# Patient Record
Sex: MALE | Race: WHITE | NOT HISPANIC OR LATINO
[De-identification: names, ages, dates, MRNs, and addresses within clinical notes are randomized per-mention and may not be internally consistent; named-entity substitution may affect disease eponyms.]

---

## 2021-01-01 ENCOUNTER — APPOINTMENT (OUTPATIENT)
Dept: INTERNAL MEDICINE | Facility: CLINIC | Age: 56
End: 2021-01-01
Payer: SELF-PAY

## 2021-01-01 ENCOUNTER — OUTPATIENT (OUTPATIENT)
Dept: OUTPATIENT SERVICES | Facility: HOSPITAL | Age: 56
LOS: 1 days | Discharge: HOME | End: 2021-01-01

## 2021-01-01 ENCOUNTER — NON-APPOINTMENT (OUTPATIENT)
Age: 56
End: 2021-01-01

## 2021-01-01 ENCOUNTER — EMERGENCY (EMERGENCY)
Facility: HOSPITAL | Age: 56
LOS: 0 days | End: 2021-11-12
Attending: EMERGENCY MEDICINE | Admitting: EMERGENCY MEDICINE
Payer: SELF-PAY

## 2021-01-01 ENCOUNTER — TRANSCRIPTION ENCOUNTER (OUTPATIENT)
Age: 56
End: 2021-01-01

## 2021-01-01 ENCOUNTER — APPOINTMENT (OUTPATIENT)
Dept: INTERNAL MEDICINE | Facility: CLINIC | Age: 56
End: 2021-01-01

## 2021-01-01 ENCOUNTER — INPATIENT (INPATIENT)
Facility: HOSPITAL | Age: 56
LOS: 0 days | Discharge: HOME | End: 2021-10-21
Attending: HOSPITALIST | Admitting: HOSPITALIST
Payer: SELF-PAY

## 2021-01-01 ENCOUNTER — EMERGENCY (EMERGENCY)
Facility: HOSPITAL | Age: 56
LOS: 0 days | Discharge: LEFT BEFORE DISCHARGE | End: 2021-10-18
Attending: EMERGENCY MEDICINE | Admitting: EMERGENCY MEDICINE
Payer: SELF-PAY

## 2021-01-01 VITALS
SYSTOLIC BLOOD PRESSURE: 184 MMHG | WEIGHT: 179.9 LBS | HEART RATE: 107 BPM | OXYGEN SATURATION: 95 % | RESPIRATION RATE: 20 BRPM | DIASTOLIC BLOOD PRESSURE: 112 MMHG | HEIGHT: 72 IN | TEMPERATURE: 96 F

## 2021-01-01 VITALS
RESPIRATION RATE: 22 BRPM | HEIGHT: 72 IN | OXYGEN SATURATION: 96 % | DIASTOLIC BLOOD PRESSURE: 122 MMHG | SYSTOLIC BLOOD PRESSURE: 237 MMHG | HEART RATE: 112 BPM | TEMPERATURE: 96 F | WEIGHT: 179.9 LBS

## 2021-01-01 VITALS
WEIGHT: 222 LBS | DIASTOLIC BLOOD PRESSURE: 111 MMHG | BODY MASS INDEX: 29.42 KG/M2 | SYSTOLIC BLOOD PRESSURE: 173 MMHG | HEART RATE: 99 BPM | OXYGEN SATURATION: 100 % | HEIGHT: 73 IN

## 2021-01-01 VITALS
HEART RATE: 96 BPM | SYSTOLIC BLOOD PRESSURE: 201 MMHG | OXYGEN SATURATION: 96 % | DIASTOLIC BLOOD PRESSURE: 103 MMHG | RESPIRATION RATE: 20 BRPM

## 2021-01-01 VITALS
DIASTOLIC BLOOD PRESSURE: 93 MMHG | OXYGEN SATURATION: 96 % | RESPIRATION RATE: 18 BRPM | SYSTOLIC BLOOD PRESSURE: 158 MMHG

## 2021-01-01 VITALS — HEIGHT: 72 IN

## 2021-01-01 VITALS — SYSTOLIC BLOOD PRESSURE: 167 MMHG | DIASTOLIC BLOOD PRESSURE: 95 MMHG

## 2021-01-01 DIAGNOSIS — Z00.00 ENCOUNTER FOR GENERAL ADULT MEDICAL EXAMINATION W/OUT ABNORMAL FINDINGS: ICD-10-CM

## 2021-01-01 DIAGNOSIS — R06.02 SHORTNESS OF BREATH: ICD-10-CM

## 2021-01-01 DIAGNOSIS — I10 ESSENTIAL (PRIMARY) HYPERTENSION: ICD-10-CM

## 2021-01-01 DIAGNOSIS — J44.1 CHRONIC OBSTRUCTIVE PULMONARY DISEASE WITH (ACUTE) EXACERBATION: ICD-10-CM

## 2021-01-01 DIAGNOSIS — I46.9 CARDIAC ARREST, CAUSE UNSPECIFIED: ICD-10-CM

## 2021-01-01 DIAGNOSIS — F17.210 NICOTINE DEPENDENCE, CIGARETTES, UNCOMPLICATED: ICD-10-CM

## 2021-01-01 DIAGNOSIS — Z80.3 FAMILY HISTORY OF MALIGNANT NEOPLASM OF BREAST: ICD-10-CM

## 2021-01-01 DIAGNOSIS — Z82.49 FAMILY HISTORY OF ISCHEMIC HEART DISEASE AND OTHER DISEASES OF THE CIRCULATORY SYSTEM: ICD-10-CM

## 2021-01-01 DIAGNOSIS — J44.9 CHRONIC OBSTRUCTIVE PULMONARY DISEASE, UNSPECIFIED: ICD-10-CM

## 2021-01-01 DIAGNOSIS — G47.33 OBSTRUCTIVE SLEEP APNEA (ADULT) (PEDIATRIC): ICD-10-CM

## 2021-01-01 DIAGNOSIS — J45.909 UNSPECIFIED ASTHMA, UNCOMPLICATED: ICD-10-CM

## 2021-01-01 DIAGNOSIS — J43.9 EMPHYSEMA, UNSPECIFIED: ICD-10-CM

## 2021-01-01 DIAGNOSIS — I71.2 THORACIC AORTIC ANEURYSM, WITHOUT RUPTURE: ICD-10-CM

## 2021-01-01 DIAGNOSIS — R00.0 TACHYCARDIA, UNSPECIFIED: ICD-10-CM

## 2021-01-01 DIAGNOSIS — Z28.21 IMMUNIZATION NOT CARRIED OUT BECAUSE OF PATIENT REFUSAL: ICD-10-CM

## 2021-01-01 DIAGNOSIS — Z20.822 CONTACT WITH AND (SUSPECTED) EXPOSURE TO COVID-19: ICD-10-CM

## 2021-01-01 DIAGNOSIS — Z87.891 PERSONAL HISTORY OF NICOTINE DEPENDENCE: ICD-10-CM

## 2021-01-01 DIAGNOSIS — I71.9 AORTIC ANEURYSM OF UNSPECIFIED SITE, WITHOUT RUPTURE: ICD-10-CM

## 2021-01-01 DIAGNOSIS — Z59.7 INSUFFICIENT SOCIAL INSURANCE AND WELFARE SUPPORT: ICD-10-CM

## 2021-01-01 LAB
ALBUMIN SERPL ELPH-MCNC: 4.1 G/DL — SIGNIFICANT CHANGE UP (ref 3.5–5.2)
ALBUMIN SERPL ELPH-MCNC: 4.3 G/DL — SIGNIFICANT CHANGE UP (ref 3.5–5.2)
ALP SERPL-CCNC: 78 U/L — SIGNIFICANT CHANGE UP (ref 30–115)
ALP SERPL-CCNC: 78 U/L — SIGNIFICANT CHANGE UP (ref 30–115)
ALT FLD-CCNC: 30 U/L — SIGNIFICANT CHANGE UP (ref 0–41)
ALT FLD-CCNC: 72 U/L — HIGH (ref 0–41)
ANION GAP SERPL CALC-SCNC: 10 MMOL/L — SIGNIFICANT CHANGE UP (ref 7–14)
ANION GAP SERPL CALC-SCNC: 12 MMOL/L — SIGNIFICANT CHANGE UP (ref 7–14)
ANION GAP SERPL CALC-SCNC: 12 MMOL/L — SIGNIFICANT CHANGE UP (ref 7–14)
AST SERPL-CCNC: 25 U/L — SIGNIFICANT CHANGE UP (ref 0–41)
AST SERPL-CCNC: 49 U/L — HIGH (ref 0–41)
BASE EXCESS BLDV CALC-SCNC: 0.9 MMOL/L — SIGNIFICANT CHANGE UP (ref -2–3)
BASOPHILS # BLD AUTO: 0.01 K/UL — SIGNIFICANT CHANGE UP (ref 0–0.2)
BASOPHILS # BLD AUTO: 0.03 K/UL — SIGNIFICANT CHANGE UP (ref 0–0.2)
BASOPHILS # BLD AUTO: 0.03 K/UL — SIGNIFICANT CHANGE UP (ref 0–0.2)
BASOPHILS NFR BLD AUTO: 0.1 % — SIGNIFICANT CHANGE UP (ref 0–1)
BASOPHILS NFR BLD AUTO: 0.3 % — SIGNIFICANT CHANGE UP (ref 0–1)
BASOPHILS NFR BLD AUTO: 0.4 % — SIGNIFICANT CHANGE UP (ref 0–1)
BILIRUB SERPL-MCNC: 0.5 MG/DL — SIGNIFICANT CHANGE UP (ref 0.2–1.2)
BILIRUB SERPL-MCNC: 0.7 MG/DL — SIGNIFICANT CHANGE UP (ref 0.2–1.2)
BUN SERPL-MCNC: 20 MG/DL — SIGNIFICANT CHANGE UP (ref 10–20)
BUN SERPL-MCNC: 28 MG/DL — HIGH (ref 10–20)
BUN SERPL-MCNC: 29 MG/DL — HIGH (ref 10–20)
CA-I SERPL-SCNC: 1.25 MMOL/L — SIGNIFICANT CHANGE UP (ref 1.15–1.33)
CALCIUM SERPL-MCNC: 10 MG/DL — SIGNIFICANT CHANGE UP (ref 8.5–10.1)
CALCIUM SERPL-MCNC: 9.6 MG/DL — SIGNIFICANT CHANGE UP (ref 8.5–10.1)
CALCIUM SERPL-MCNC: 9.7 MG/DL — SIGNIFICANT CHANGE UP (ref 8.5–10.1)
CHLORIDE SERPL-SCNC: 101 MMOL/L — SIGNIFICANT CHANGE UP (ref 98–110)
CHLORIDE SERPL-SCNC: 101 MMOL/L — SIGNIFICANT CHANGE UP (ref 98–110)
CHLORIDE SERPL-SCNC: 105 MMOL/L — SIGNIFICANT CHANGE UP (ref 98–110)
CO2 SERPL-SCNC: 22 MMOL/L — SIGNIFICANT CHANGE UP (ref 17–32)
CO2 SERPL-SCNC: 23 MMOL/L — SIGNIFICANT CHANGE UP (ref 17–32)
CO2 SERPL-SCNC: 23 MMOL/L — SIGNIFICANT CHANGE UP (ref 17–32)
COVID-19 SPIKE DOMAIN AB INTERP: POSITIVE
COVID-19 SPIKE DOMAIN ANTIBODY RESULT: 150 U/ML — HIGH
CREAT SERPL-MCNC: 1.1 MG/DL — SIGNIFICANT CHANGE UP (ref 0.7–1.5)
CREAT SERPL-MCNC: 1.1 MG/DL — SIGNIFICANT CHANGE UP (ref 0.7–1.5)
CREAT SERPL-MCNC: 1.2 MG/DL — SIGNIFICANT CHANGE UP (ref 0.7–1.5)
EOSINOPHIL # BLD AUTO: 0 K/UL — SIGNIFICANT CHANGE UP (ref 0–0.7)
EOSINOPHIL # BLD AUTO: 0.15 K/UL — SIGNIFICANT CHANGE UP (ref 0–0.7)
EOSINOPHIL # BLD AUTO: 0.19 K/UL — SIGNIFICANT CHANGE UP (ref 0–0.7)
EOSINOPHIL NFR BLD AUTO: 0 % — SIGNIFICANT CHANGE UP (ref 0–8)
EOSINOPHIL NFR BLD AUTO: 1.8 % — SIGNIFICANT CHANGE UP (ref 0–8)
EOSINOPHIL NFR BLD AUTO: 1.9 % — SIGNIFICANT CHANGE UP (ref 0–8)
GAS PNL BLDV: 134 MMOL/L — LOW (ref 136–145)
GAS PNL BLDV: SIGNIFICANT CHANGE UP
GLUCOSE SERPL-MCNC: 105 MG/DL — HIGH (ref 70–99)
GLUCOSE SERPL-MCNC: 113 MG/DL — HIGH (ref 70–99)
GLUCOSE SERPL-MCNC: 129 MG/DL — HIGH (ref 70–99)
HCO3 BLDV-SCNC: 25 MMOL/L — SIGNIFICANT CHANGE UP (ref 22–29)
HCT VFR BLD CALC: 41.3 % — LOW (ref 42–52)
HCT VFR BLD CALC: 43.2 % — SIGNIFICANT CHANGE UP (ref 42–52)
HCT VFR BLD CALC: 44 % — SIGNIFICANT CHANGE UP (ref 42–52)
HCT VFR BLDA CALC: 46 % — SIGNIFICANT CHANGE UP (ref 39–51)
HGB BLD CALC-MCNC: 15.4 G/DL — SIGNIFICANT CHANGE UP (ref 12.6–17.4)
HGB BLD-MCNC: 13.9 G/DL — LOW (ref 14–18)
HGB BLD-MCNC: 14.7 G/DL — SIGNIFICANT CHANGE UP (ref 14–18)
HGB BLD-MCNC: 15 G/DL — SIGNIFICANT CHANGE UP (ref 14–18)
IMM GRANULOCYTES NFR BLD AUTO: 0.4 % — HIGH (ref 0.1–0.3)
IMM GRANULOCYTES NFR BLD AUTO: 0.5 % — HIGH (ref 0.1–0.3)
IMM GRANULOCYTES NFR BLD AUTO: 0.6 % — HIGH (ref 0.1–0.3)
LACTATE BLDV-MCNC: 0.9 MMOL/L — SIGNIFICANT CHANGE UP (ref 0.5–2)
LYMPHOCYTES # BLD AUTO: 0.59 K/UL — LOW (ref 1.2–3.4)
LYMPHOCYTES # BLD AUTO: 1.19 K/UL — LOW (ref 1.2–3.4)
LYMPHOCYTES # BLD AUTO: 1.28 K/UL — SIGNIFICANT CHANGE UP (ref 1.2–3.4)
LYMPHOCYTES # BLD AUTO: 12.2 % — LOW (ref 20.5–51.1)
LYMPHOCYTES # BLD AUTO: 15.3 % — LOW (ref 20.5–51.1)
LYMPHOCYTES # BLD AUTO: 7.3 % — LOW (ref 20.5–51.1)
MAGNESIUM SERPL-MCNC: 2.3 MG/DL — SIGNIFICANT CHANGE UP (ref 1.8–2.4)
MCHC RBC-ENTMCNC: 30.5 PG — SIGNIFICANT CHANGE UP (ref 27–31)
MCHC RBC-ENTMCNC: 30.6 PG — SIGNIFICANT CHANGE UP (ref 27–31)
MCHC RBC-ENTMCNC: 30.9 PG — SIGNIFICANT CHANGE UP (ref 27–31)
MCHC RBC-ENTMCNC: 33.4 G/DL — SIGNIFICANT CHANGE UP (ref 32–37)
MCHC RBC-ENTMCNC: 33.7 G/DL — SIGNIFICANT CHANGE UP (ref 32–37)
MCHC RBC-ENTMCNC: 34.7 G/DL — SIGNIFICANT CHANGE UP (ref 32–37)
MCV RBC AUTO: 89.1 FL — SIGNIFICANT CHANGE UP (ref 80–94)
MCV RBC AUTO: 91 FL — SIGNIFICANT CHANGE UP (ref 80–94)
MCV RBC AUTO: 91.3 FL — SIGNIFICANT CHANGE UP (ref 80–94)
MONOCYTES # BLD AUTO: 0.44 K/UL — SIGNIFICANT CHANGE UP (ref 0.1–0.6)
MONOCYTES # BLD AUTO: 0.57 K/UL — SIGNIFICANT CHANGE UP (ref 0.1–0.6)
MONOCYTES # BLD AUTO: 0.64 K/UL — HIGH (ref 0.1–0.6)
MONOCYTES NFR BLD AUTO: 5.4 % — SIGNIFICANT CHANGE UP (ref 1.7–9.3)
MONOCYTES NFR BLD AUTO: 6.6 % — SIGNIFICANT CHANGE UP (ref 1.7–9.3)
MONOCYTES NFR BLD AUTO: 6.8 % — SIGNIFICANT CHANGE UP (ref 1.7–9.3)
NEUTROPHILS # BLD AUTO: 6.32 K/UL — SIGNIFICANT CHANGE UP (ref 1.4–6.5)
NEUTROPHILS # BLD AUTO: 7.04 K/UL — HIGH (ref 1.4–6.5)
NEUTROPHILS # BLD AUTO: 7.67 K/UL — HIGH (ref 1.4–6.5)
NEUTROPHILS NFR BLD AUTO: 75.3 % — HIGH (ref 42.2–75.2)
NEUTROPHILS NFR BLD AUTO: 78.5 % — HIGH (ref 42.2–75.2)
NEUTROPHILS NFR BLD AUTO: 86.6 % — HIGH (ref 42.2–75.2)
NRBC # BLD: 0 /100 WBCS — SIGNIFICANT CHANGE UP (ref 0–0)
NT-PROBNP SERPL-SCNC: 1541 PG/ML — HIGH (ref 0–300)
NT-PROBNP SERPL-SCNC: 2430 PG/ML — HIGH (ref 0–300)
PCO2 BLDV: 39 MMHG — LOW (ref 42–55)
PH BLDV: 7.42 — SIGNIFICANT CHANGE UP (ref 7.32–7.43)
PLATELET # BLD AUTO: 205 K/UL — SIGNIFICANT CHANGE UP (ref 130–400)
PLATELET # BLD AUTO: 216 K/UL — SIGNIFICANT CHANGE UP (ref 130–400)
PLATELET # BLD AUTO: 232 K/UL — SIGNIFICANT CHANGE UP (ref 130–400)
PO2 BLDV: 55 MMHG — SIGNIFICANT CHANGE UP
POTASSIUM BLDV-SCNC: 4.2 MMOL/L — SIGNIFICANT CHANGE UP (ref 3.5–5.1)
POTASSIUM SERPL-MCNC: 4.3 MMOL/L — SIGNIFICANT CHANGE UP (ref 3.5–5)
POTASSIUM SERPL-MCNC: 4.8 MMOL/L — SIGNIFICANT CHANGE UP (ref 3.5–5)
POTASSIUM SERPL-MCNC: 5 MMOL/L — SIGNIFICANT CHANGE UP (ref 3.5–5)
POTASSIUM SERPL-SCNC: 4.3 MMOL/L — SIGNIFICANT CHANGE UP (ref 3.5–5)
POTASSIUM SERPL-SCNC: 4.8 MMOL/L — SIGNIFICANT CHANGE UP (ref 3.5–5)
POTASSIUM SERPL-SCNC: 5 MMOL/L — SIGNIFICANT CHANGE UP (ref 3.5–5)
PROT SERPL-MCNC: 6.6 G/DL — SIGNIFICANT CHANGE UP (ref 6–8)
PROT SERPL-MCNC: 7 G/DL — SIGNIFICANT CHANGE UP (ref 6–8)
RAPID RVP RESULT: SIGNIFICANT CHANGE UP
RBC # BLD: 4.54 M/UL — LOW (ref 4.7–6.1)
RBC # BLD: 4.82 M/UL — SIGNIFICANT CHANGE UP (ref 4.7–6.1)
RBC # BLD: 4.85 M/UL — SIGNIFICANT CHANGE UP (ref 4.7–6.1)
RBC # FLD: 12.1 % — SIGNIFICANT CHANGE UP (ref 11.5–14.5)
RBC # FLD: 12.1 % — SIGNIFICANT CHANGE UP (ref 11.5–14.5)
RBC # FLD: 12.5 % — SIGNIFICANT CHANGE UP (ref 11.5–14.5)
SAO2 % BLDV: 87.5 % — SIGNIFICANT CHANGE UP
SARS-COV-2 IGG+IGM SERPL QL IA: 150 U/ML — HIGH
SARS-COV-2 IGG+IGM SERPL QL IA: POSITIVE
SARS-COV-2 RNA SPEC QL NAA+PROBE: SIGNIFICANT CHANGE UP
SARS-COV-2 RNA SPEC QL NAA+PROBE: SIGNIFICANT CHANGE UP
SODIUM SERPL-SCNC: 135 MMOL/L — SIGNIFICANT CHANGE UP (ref 135–146)
SODIUM SERPL-SCNC: 136 MMOL/L — SIGNIFICANT CHANGE UP (ref 135–146)
SODIUM SERPL-SCNC: 138 MMOL/L — SIGNIFICANT CHANGE UP (ref 135–146)
TROPONIN T SERPL-MCNC: <0.01 NG/ML — SIGNIFICANT CHANGE UP
TROPONIN T SERPL-MCNC: <0.01 NG/ML — SIGNIFICANT CHANGE UP
TSH SERPL-MCNC: 0.7 UIU/ML — SIGNIFICANT CHANGE UP (ref 0.27–4.2)
WBC # BLD: 8.13 K/UL — SIGNIFICANT CHANGE UP (ref 4.8–10.8)
WBC # BLD: 8.38 K/UL — SIGNIFICANT CHANGE UP (ref 4.8–10.8)
WBC # BLD: 9.77 K/UL — SIGNIFICANT CHANGE UP (ref 4.8–10.8)
WBC # FLD AUTO: 8.13 K/UL — SIGNIFICANT CHANGE UP (ref 4.8–10.8)
WBC # FLD AUTO: 8.38 K/UL — SIGNIFICANT CHANGE UP (ref 4.8–10.8)
WBC # FLD AUTO: 9.77 K/UL — SIGNIFICANT CHANGE UP (ref 4.8–10.8)

## 2021-01-01 PROCEDURE — 71046 X-RAY EXAM CHEST 2 VIEWS: CPT | Mod: 26

## 2021-01-01 PROCEDURE — 99285 EMERGENCY DEPT VISIT HI MDM: CPT

## 2021-01-01 PROCEDURE — 93010 ELECTROCARDIOGRAM REPORT: CPT

## 2021-01-01 PROCEDURE — 71275 CT ANGIOGRAPHY CHEST: CPT | Mod: 26,MA

## 2021-01-01 PROCEDURE — 99053 MED SERV 10PM-8AM 24 HR FAC: CPT

## 2021-01-01 PROCEDURE — 99239 HOSP IP/OBS DSCHRG MGMT >30: CPT

## 2021-01-01 PROCEDURE — 99214 OFFICE O/P EST MOD 30 MIN: CPT | Mod: GC

## 2021-01-01 PROCEDURE — 99406 BEHAV CHNG SMOKING 3-10 MIN: CPT

## 2021-01-01 PROCEDURE — 99223 1ST HOSP IP/OBS HIGH 75: CPT | Mod: 25

## 2021-01-01 PROCEDURE — 71045 X-RAY EXAM CHEST 1 VIEW: CPT | Mod: 26

## 2021-01-01 PROCEDURE — 99222 1ST HOSP IP/OBS MODERATE 55: CPT

## 2021-01-01 PROCEDURE — 93306 TTE W/DOPPLER COMPLETE: CPT | Mod: 26

## 2021-01-01 PROCEDURE — 99253 IP/OBS CNSLTJ NEW/EST LOW 45: CPT

## 2021-01-01 RX ORDER — IPRATROPIUM/ALBUTEROL SULFATE 18-103MCG
3 AEROSOL WITH ADAPTER (GRAM) INHALATION EVERY 6 HOURS
Refills: 0 | Status: DISCONTINUED | OUTPATIENT
Start: 2021-01-01 | End: 2021-01-01

## 2021-01-01 RX ORDER — FLUTICASONE PROPIONATE AND SALMETEROL 500; 50 UG/1; UG/1
500-50 POWDER RESPIRATORY (INHALATION)
Qty: 1 | Refills: 6 | Status: ACTIVE | COMMUNITY
Start: 2021-01-01 | End: 1900-01-01

## 2021-01-01 RX ORDER — BUDESONIDE AND FORMOTEROL FUMARATE DIHYDRATE 160; 4.5 UG/1; UG/1
160-4.5 AEROSOL RESPIRATORY (INHALATION) TWICE DAILY
Qty: 1 | Refills: 6 | Status: DISCONTINUED | COMMUNITY
Start: 2021-01-01 | End: 2021-01-01

## 2021-01-01 RX ORDER — FLUTICASONE PROPIONATE AND SALMETEROL 50; 250 UG/1; UG/1
1 POWDER ORAL; RESPIRATORY (INHALATION)
Qty: 1 | Refills: 0
Start: 2021-01-01

## 2021-01-01 RX ORDER — PREDNISONE 20 MG/1
20 TABLET ORAL
Refills: 0 | Status: ACTIVE | COMMUNITY
Start: 2021-01-01

## 2021-01-01 RX ORDER — ENOXAPARIN SODIUM 100 MG/ML
40 INJECTION SUBCUTANEOUS DAILY
Refills: 0 | Status: DISCONTINUED | OUTPATIENT
Start: 2021-01-01 | End: 2021-01-01

## 2021-01-01 RX ORDER — HYDRALAZINE HCL 50 MG
10 TABLET ORAL ONCE
Refills: 0 | Status: COMPLETED | OUTPATIENT
Start: 2021-01-01 | End: 2021-01-01

## 2021-01-01 RX ORDER — AMLODIPINE BESYLATE 2.5 MG/1
10 TABLET ORAL DAILY
Refills: 0 | Status: DISCONTINUED | OUTPATIENT
Start: 2021-01-01 | End: 2021-01-01

## 2021-01-01 RX ORDER — TIOTROPIUM BROMIDE 18 UG/1
18 CAPSULE ORAL; RESPIRATORY (INHALATION) DAILY
Qty: 1 | Refills: 5 | Status: DISCONTINUED | COMMUNITY
Start: 2021-01-01 | End: 2021-01-01

## 2021-01-01 RX ORDER — ALBUTEROL 90 UG/1
2 AEROSOL, METERED ORAL
Qty: 1 | Refills: 0
Start: 2021-01-01

## 2021-01-01 RX ORDER — ALBUTEROL SULFATE 90 UG/1
108 (90 BASE) INHALANT RESPIRATORY (INHALATION)
Refills: 0 | Status: ACTIVE | COMMUNITY
Start: 2021-01-01

## 2021-01-01 RX ORDER — AZITHROMYCIN 500 MG/1
500 TABLET, FILM COATED ORAL EVERY 24 HOURS
Refills: 0 | Status: DISCONTINUED | OUTPATIENT
Start: 2021-01-01 | End: 2021-01-01

## 2021-01-01 RX ORDER — AMLODIPINE BESYLATE 2.5 MG/1
1 TABLET ORAL
Qty: 30 | Refills: 0
Start: 2021-01-01

## 2021-01-01 RX ORDER — LABETALOL HCL 100 MG
100 TABLET ORAL ONCE
Refills: 0 | Status: COMPLETED | OUTPATIENT
Start: 2021-01-01 | End: 2021-01-01

## 2021-01-01 RX ORDER — IPRATROPIUM/ALBUTEROL SULFATE 18-103MCG
3 AEROSOL WITH ADAPTER (GRAM) INHALATION
Refills: 0 | Status: COMPLETED | OUTPATIENT
Start: 2021-01-01 | End: 2021-01-01

## 2021-01-01 RX ORDER — AMLODIPINE BESYLATE 2.5 MG/1
5 TABLET ORAL DAILY
Refills: 0 | Status: DISCONTINUED | OUTPATIENT
Start: 2021-01-01 | End: 2021-01-01

## 2021-01-01 RX ORDER — AMLODIPINE BESYLATE 5 MG/1
5 TABLET ORAL DAILY
Qty: 30 | Refills: 2 | Status: DISCONTINUED | COMMUNITY
Start: 2021-01-01 | End: 2021-01-01

## 2021-01-01 RX ORDER — CHLORHEXIDINE GLUCONATE 213 G/1000ML
1 SOLUTION TOPICAL
Refills: 0 | Status: DISCONTINUED | OUTPATIENT
Start: 2021-01-01 | End: 2021-01-01

## 2021-01-01 RX ORDER — VALSARTAN AND HYDROCHLOROTHIAZIDE 160; 25 MG/1; MG/1
160-25 TABLET, FILM COATED ORAL DAILY
Qty: 90 | Refills: 2 | Status: ACTIVE | COMMUNITY
Start: 2021-01-01 | End: 1900-01-01

## 2021-01-01 RX ADMIN — Medication 100 MILLIGRAM(S): at 09:14

## 2021-01-01 RX ADMIN — Medication 125 MILLIGRAM(S): at 16:47

## 2021-01-01 RX ADMIN — Medication 3 MILLILITER(S): at 16:31

## 2021-01-01 RX ADMIN — AMLODIPINE BESYLATE 5 MILLIGRAM(S): 2.5 TABLET ORAL at 06:30

## 2021-01-01 RX ADMIN — Medication 40 MILLIGRAM(S): at 06:30

## 2021-01-01 RX ADMIN — Medication 3 MILLILITER(S): at 15:02

## 2021-01-01 RX ADMIN — Medication 3 MILLILITER(S): at 16:51

## 2021-01-01 RX ADMIN — AZITHROMYCIN 255 MILLIGRAM(S): 500 TABLET, FILM COATED ORAL at 22:05

## 2021-01-01 RX ADMIN — Medication 10 MILLIGRAM(S): at 07:13

## 2021-01-01 RX ADMIN — Medication 125 MILLIGRAM(S): at 16:30

## 2021-01-01 RX ADMIN — Medication 3 MILLILITER(S): at 17:44

## 2021-01-01 SDOH — ECONOMIC STABILITY - INCOME SECURITY: INSUFFICIENT SOCIAL INSURANCE AND WELFARE SUPPORT: Z59.7

## 2021-10-18 NOTE — ED ADULT TRIAGE NOTE - CHIEF COMPLAINT QUOTE
pt c/o SOB, started last night and got worse throughout day, pt able tp speak in full sentences, po2 96% on RA hx of asthma

## 2021-10-18 NOTE — ED PROVIDER NOTE - CLINICAL SUMMARY MEDICAL DECISION MAKING FREE TEXT BOX
Patient presents for evaluation of sob that has worsened over the past several hours.  he denies any chest pain he denies any vomiting he denies any diaphoresis on exam he has improved we obtained ct chest which reveals dilation of his aorta he was informed of this I advised him to be admitted to the hospital at this time. he refused stating that he could not be admitted for financial reasons stating that he would potentially need income in addition expressed concerns about lacking insurance. I have offered resources here to help and stated that it is not in his best interest to leave at this time despite financial hardships he unfortunately refused to stay despite the above measures, I informed him that he is at high risk of dangerous reasons for sob, specifically "heart problems", and "abnormal heart beats" he understands this and actually acknowledges that this is not in his best interest but refuses to stay stating " I am not sitting here" He understand he should return for any concerns and was informed we are available 24/7.  he understand he should have a low threshold for return.  Patient instructed to follow up with cardiology, pulmonology and vascular in the next 24-48 hours or return to the ED for anything

## 2021-10-18 NOTE — ED PROVIDER NOTE - ATTENDING CONTRIBUTION TO CARE
I was present for and supervised the key and critical aspects of the procedures performed during the care of the patient. patient presents for evaluation of worsenin sob over the past several hours, he denies any chest pain, he denies any diaphoresis nausea or vomiting.  he notes with exertion he becomes more sob  on exam patient has accessory muscle use.  with mild tachypnea.  otherwise he is nc/at perrla eomi oropharynx clear cta b/l, rrr s1s2 noted abd-soft nt ndb s+ et from with no edema at this time pedal pulses 2 +=   A/P- patient given solumedrol, albuterol we obtained labs including troponin as well as cta given his history and pe patient informed admission likely I will continue to monitor at this time

## 2021-10-18 NOTE — ED PROVIDER NOTE - PROVIDER TOKENS
PROVIDER:[TOKEN:[[id number]]],PROVIDER:[TOKEN:[[id number]]],PROVIDER:[TOKEN:[[id number]]]

## 2021-10-18 NOTE — ED PROVIDER NOTE - NSFOLLOWUPINSTRUCTIONS_ED_ALL_ED_FT
Follow up as soon as possible with your primary care doctor, your cardiologist, your lung doctor, and thoracic surgeon      Shortness of Breath    WHAT YOU NEED TO KNOW:    Shortness of breath is a feeling that you cannot get enough air when you breathe in. You may have this feeling only during activity, or all the time. Your symptoms can range from mild to severe. Shortness of breath may be a sign of a serious health condition that needs immediate care.    DISCHARGE INSTRUCTIONS:    Return to the emergency department if:     Your signs and symptoms are the same or worse within 24 hours of treatment.       The skin over your ribs or on your neck sinks in when you breathe.       You feel confused or dizzy.    Contact your healthcare provider if:     You have new or worsening symptoms.      You have questions or concerns about your condition or care.    Medicines:     Medicines may be used to treat the cause of your symptoms. You may need medicine to treat a bacterial infection or reduce anxiety. Other medicines may be used to open your airway, reduce swelling, or remove extra fluid. If you have a heart condition, you may need medicine to help your heart beat more strongly or regularly.      Take your medicine as directed. Contact your healthcare provider if you think your medicine is not helping or if you have side effects. Tell him or her if you are allergic to any medicine. Keep a list of the medicines, vitamins, and herbs you take. Include the amounts, and when and why you take them. Bring the list or the pill bottles to follow-up visits. Carry your medicine list with you in case of an emergency.    Manage shortness of breath:     Create an action plan. You and your healthcare provider can work together to create a plan for how to handle shortness of breath. The plan can include daily activities, treatment changes, and what to do if you have severe breathing problems.      Lean forward on your elbows when you sit. This helps your lungs expand and may make it easier to breathe.      Use pursed-lip breathing any time you feel short of breath. Breathe in through your nose and then slowly breathe out through your mouth with your lips slightly puckered. It should take you twice as long to breathe out as it did to breathe in.Breathe in Breathe out           Do not smoke. Nicotine and other chemicals in cigarettes and cigars can cause lung damage and make shortness of breath worse. Ask your healthcare provider for information if you currently smoke and need help to quit. E-cigarettes or smokeless tobacco still contain nicotine. Talk to your healthcare provider before you use these products.      Reach or maintain a healthy weight. Your healthcare provider can help you create a safe weight loss plan if you are overweight.      Exercise as directed. Exercise can help your lungs work more easily. Exercise can also help you lose weight if needed. Try to get at least 30 minutes of exercise most days of the week.    Follow up with your healthcare provider or specialist as directed: Write down your questions so you remember to ask them during your visits.       © Copyright [employer] 2019 All illustrations and images included in CareNotes are the copyrighted property of [employer] or [employer].

## 2021-10-18 NOTE — ED PROVIDER NOTE - CARE PROVIDERS DIRECT ADDRESSES
,[email or web address]

## 2021-10-18 NOTE — ED PROVIDER NOTE - PATIENT PORTAL LINK FT
You can access the FollowMyHealth Patient Portal offered by [facility] by registering at the following website: [email or web address]. By joining [employer]’s FollowMyHealth portal, you will also be able to view your health information using other applications (apps) compatible with our system.

## 2021-10-18 NOTE — ED PROVIDER NOTE - OBJECTIVE STATEMENT
56yoM w/ pmhx of asthma and HTN who present with worsening SOB. last night he was at work as a  and started having SOB, it improved and he went home and slept. woke up this afternoon 4hr pta and felt worsening sob. difficulty to lie flat. no hx of CHF. he does not take any meds or follow up with PMD due to insurance problems. no hx or fhx of bloodclots. smoker 1.5ppd for 43 years. occasional drinker. does not use drugs. denies chest pain nausea vomiting back pain abd pain. no recent illness or travel.

## 2021-10-18 NOTE — ED PROVIDER NOTE - PROGRESS NOTE DETAILS
RK: patient feels a lot better s/p solumedrol and albuterol. patient work of breathing and tachypnea have improved significantly as well. The patient wishes to leave against medical advice.  I have discussed the risks, benefits and alternatives (including the possibility of worsening of disease, pain, permanent disability, and/or death) with the patient and his/her family (if available).  The patient voices understanding of these risks, benefits, and alternatives and still wishes to sign out against medical advice.  The patient is awake, alert, oriented  x 3 and has demonstrated capacity to refuse/direct care.  I have advised the patient that they can and should return immediately should they develop any worse/different/additional symptoms, or if they change their mind and want to continue their care.

## 2021-10-18 NOTE — ED PROVIDER NOTE - PHYSICAL EXAMINATION
CONSTITUTIONAL: well-developed, well-nourished, in no acute distress  SKIN: warm, dry  HEAD: Normocephalic atraumatic  EYES: no conjunctival erythema no sclera icterus  ENT: no nasal discharge, airway clear, normal appearing oropharynx no drooling or exudate or trismus, no perioral cyanosis  NECK: full ROM  CARD: tachycardic   RESP: severely increased work of breathing, hunching over, no crackles rhonchi wheezing  ABD: soft, non-distended, non-tender  EXT: moving all extremities spontaneously, no cyanosis  NEURO: alert and oriented, grossly unremarkable  PSYCH: cooperative, appropriate

## 2021-10-18 NOTE — ED PROVIDER NOTE - CARE PROVIDER_API CALL
Emmanuel Cornejo)  Cardiovascular Disease; Internal Medicine  [address] [place] [address]  Phone: [phone]  Fax: [phone]  Follow Up Time:     Tanner Singer)  Critical Care Medicine; Internal Medicine; Pulmonary Disease; Sleep Medicine  [address] [place] [address]  Phone: [phone]  Fax: [phone]  Follow Up Time:     Trae Carrasco)  Surgery; Thoracic and Cardiac Surgery  [address] [place] [address], [address]  [place] [address]  Phone: [phone]  Fax: [phone]  Follow Up Time:

## 2021-10-20 PROBLEM — I10 ESSENTIAL (PRIMARY) HYPERTENSION: Chronic | Status: ACTIVE | Noted: 2021-01-01

## 2021-10-20 PROBLEM — J45.909 UNSPECIFIED ASTHMA, UNCOMPLICATED: Chronic | Status: ACTIVE | Noted: 2021-01-01

## 2021-10-20 NOTE — H&P ADULT - NSHPPHYSICALEXAM_GEN_ALL_CORE
Vital Signs Last 24 Hrs    T(F): 96.5 (10-20-21 @ 13:49), Max: 96.5 (10-20-21 @ 13:49)  HR: 99 (10-20-21 @ 15:02) (99 - 107)  BP: 154/85 (10-20-21 @ 15:02)  RR: 20 (10-20-21 @ 15:02) (20 - 20)  SpO2: 96% (10-20-21 @ 15:02) (95% - 96%)    PHYSICAL EXAM:      Constitutional: NAD, A&O x3    Eyes: PERRLA    Respiratory: +air entry, no rales, no rhonchi, ** few scattered wheezes, **  rhonchi    Cardiovascular: +S1 and S2, regular rate and rhythm    Gastrointestinal: +BS, soft, non-tender, not distended    Extremities:  no edema, no calf tenderness    Vascular: +dorsal pedis and radial pulses, no extremity cyanosis    Neurological: sensation intact, ROM equal B/L, CN II-XII intact    Skin: no rashes, normal turgor Constitutional: NAD, A&O x3    Eyes: PERRLA    Respiratory: +air entry, no rales, no rhonchi, ** few scattered wheezes, **  rhonchi    Cardiovascular: +S1 and S2, regular rate and rhythm    Gastrointestinal: +BS, soft, non-tender, not distended    Extremities:  no edema, no calf tenderness    Vascular: +dorsal pedis and radial pulses, no extremity cyanosis    Neurological: sensation intact, ROM equal B/L, CN II-XII intact    Skin: no rashes, normal turgor

## 2021-10-20 NOTE — ED PROVIDER NOTE - PHYSICAL EXAMINATION
CONSTITUTIONAL: In moderate distress due to respiratory distress.   HEAD: Normocephalic; atraumatic.   EYES: Pupils are round and reactive, extra-ocular muscles are intact. Eyelids are normal in appearance without swelling or lesions.   ENT: External ears are non-tender and without swelling or erythema. Hearing is intact with good acuity to spoken voice.  Patient is speaking clearly, not muffled and airway is intact.   NECK: Neck is supple without adenopathy.   RESPIRATORY: Crackled heard in all lobes. + respiratory distress. Chest wall is symmetric without deformity.   CARDIOVASCULAR: Regular rate and rhythm. Normal peripheral perfusion.   GI: Abdomen is soft, non-tender, and without distention. Bowel sounds are present and normoactive in all four quadrants. No masses are noted. No rebound, guarding, or rigidity.  BACK: No evidence of trauma or deformity. No midline tenderness. Normal ROM.   EXT: Normal appearance and ROM in all four extremities. No tenderness to palpation and distal pulses are normal. Sensation to the upper and lower extremities is normal bilaterally. SKIN: Skin is warm, dry and intact without apparent rashes or lesions.   NEURO: A & O x 4. Normal speech. Motor function is normal with good muscle strength to upper and lower extremities. Sensation is intact to all extremities. Cranial nerves are intact.  PSYCHOLOGICAL: Appropriate mood and affect. Good judgement and insight. No visual or auditory hallucinations.

## 2021-10-20 NOTE — ED PROVIDER NOTE - NS ED ROS FT
Constitutional: Negative for fever, chills, weight change, and fatigue.  HENT: Negative for headache, hearing change, ear pain, nasal congestion, and sore throat.  Eyes: Negative for eye pain, eye discharge, foreign body sensation, and vision change.  Cardiovascular: Negative for chest pain, palpitation, and orthopnea.  Respiratory: + SOB and cough. Negative for wheezing, and sputum production.  Gastrointestinal: Negative for nausea, vomiting, abdominal pain, constipation, diarrhea, hematochezia, and melena.  Genitourinary: Negative for flank pain, dysuria, urgency, frequency, hematuria, and urinary retention.  Neurological: Negative for dizziness, syncope, focal weakness, numbness, and loss of consciousness.  Musculoskeletal: Negative for joint swelling, arthralgias, back pain, neck pain, and calf cramps.  Hematological: Negative for adenopathy. Does not bruise/bleed easily.  Psychiatric/Behavioral: Negative for anxiety/panic, depression, delusions.

## 2021-10-20 NOTE — H&P ADULT - HISTORY OF PRESENT ILLNESS
57 y/o male Hx: Asthma/HTN: presents to ER c/o Dyspnea. He was seen in the ER 2 days ago and given 2 respiratory treatments with relief. The patient was discharged home.  - He returns today with dyspnea again.  - Denies wheeze, fever, chills. He admits to chronic sputum production that is whitish in color  - No recent upper respiratory illness.  - No asthma attacks in years  - patient has been a heavy tobacco user for 42 years:  1.5 PPD

## 2021-10-20 NOTE — H&P ADULT - PROBLEM SELECTOR PLAN 1
- IV Solumedrol, Respiratory treatments, Pulmonary consult - IV Solumedrol, Respiratory treatments, Pulmonary consult.  - CT Angiogram done 10/18/2021: negative for PE - IV Solumedrol, Respiratory treatments, Pulmonary consult.  - CT Angiogram done 10/18/2021: negative for PE  - Azithromycin

## 2021-10-20 NOTE — H&P ADULT - NSICDXPASTSURGICALHX_GEN_ALL_CORE_FT
PAST SURGICAL HISTORY:  No significant past surgical history
What Is The Reason For Today's Visit?: Full Body Skin Examination
What Is The Reason For Today's Visit? (Being Monitored For X): concerning skin lesions on an annual basis

## 2021-10-20 NOTE — H&P ADULT - PROBLEM SELECTOR PLAN 2
- has been on medication in the past but stopped dure to no insurance.  - follow BP trend, may need antihypertensive  - DASH diet - has been on medication in the past but stopped dure to no insurance.  - follow BP trend, start Norvasc  - ECHO to access LV function and Aortic root  - Cardiology Consult  - DASH diet

## 2021-10-20 NOTE — ED PROVIDER NOTE - CLINICAL SUMMARY MEDICAL DECISION MAKING FREE TEXT BOX
56 year old male with pmh 64 pack year smoker, reactive airway disease, who returns today for SOB. EKG non-ischemic, CXR clear. Recent CTA neg for PE. pt given duoneb, solumedrol and admitted for further treatment, eval by pulm, and recommend TTE.

## 2021-10-20 NOTE — H&P ADULT - NSICDXFAMILYHX_GEN_ALL_CORE_FT
FAMILY HISTORY:  Mother  Still living? Yes, Estimated age: Age Unknown  Family history of breast cancer in mother, Age at diagnosis: Age Unknown  Family history of heart attack, Age at diagnosis: Age Unknown

## 2021-10-20 NOTE — H&P ADULT - NSHPLABSRESULTS_GEN_ALL_CORE
13.9   8.38  )-----------( 205      ( 20 Oct 2021 14:59 )             41.3       10-20    138  |  105  |  28<H>  ----------------------------<  105<H>  4.8   |  23  |  1.2    Ca    9.7      20 Oct 2021 14:59    TPro  6.6  /  Alb  4.1  /  TBili  0.5  /  DBili  x   /  AST  49<H>  /  ALT  72<H>  /  AlkPhos  78  10-20        CARDIAC MARKERS ( 20 Oct 2021 14:59 )  x     / <0.01 ng/mL        Xray Chest 2 Views PA/Lat (10.20.21 @ 15:53) >    Impression:    No radiographic evidence of acute cardiopulmonary disease.     CT Angio Chest PE Protocol w/ IV Cont (10.18.21 @ 19:31) >      IMPRESSION:    No CTA evidence of acute pulmonary embolus.    Aneurysmal dilatation of the aortic root measuring up to 4.9 cm. Consider nonemergent/outpatient follow-up with gated cardiac CTA as clinically indicated.

## 2021-10-20 NOTE — H&P ADULT - ASSESSMENT
53 y/o male admitted with likely COPD exacerbation. 56 yr old non complaint (to meds) male with hx of COPD and HTN admitted for sob.    # COPD Exacerbation   - b/l wheeze on exam   - CT Angiogram done 10/18/2021: negative for PE  - start solumedrol 40mg q12  - start DuoNeb  - Start Azithromycin   - pulmonary consult     # HTN  - bp elevated  - not on antihtn meds at home   - start amlodipine 5mg daily    # Aortic Root Dilation   - CT Angio Chest PE Protocol w/ IV Cont (10.18.21): Aneurysmal dilatation of the aortic root measuring up to 4.9 cm. Consider nonemergent/outpatient follow-up with gated cardiac CTA as clinically indicated.  - BNP: 1500  - check ECHO  - cardiology consult     # Nicotine Abuse  - counselled to quit smoking (3 mins)  - start nicotine patch     # DASH diet    # DVT ppx  - start Lovenox     # Ambulate as tolerated    # Full code 56 yr old non complaint (to meds) male with hx of COPD and HTN admitted for sob.    # COPD Exacerbation   - b/l wheeze on exam   - CT Angiogram done 10/18/2021: negative for PE  - start solumedrol 40mg q12  - start DuoNeb  - Start Azithromycin   - pulmonary consult     # HTN  - bp elevated  - not on antihtn meds at home   - start amlodipine 5mg daily    # Aortic Root Aneurysm   - CT Angio Chest PE Protocol w/ IV Cont (10.18.21): Aneurysmal dilatation of the aortic root measuring up to 4.9 cm. Consider nonemergent/outpatient follow-up with gated cardiac CTA as clinically indicated.  - BNP: 1500  - check ECHO  - cardiology consult     # Ant neck swelling   - check TSH  - outpt thyroid US    # Nicotine Abuse  - counselled to quit smoking (3 mins)  - refused nicotine patch     # Social consult   - unable to afford meds    # DASH diet    # DVT ppx  - start Lovenox     # Ambulate as tolerated    # Full code

## 2021-10-20 NOTE — H&P ADULT - NSHPSOCIALHISTORY_GEN_ALL_CORE
Tobacco use: 42 yrs, 1.5 PPD, stopped last week  EtOH use:  3 x/week, usually 1/2 pt liquor  Illicit drug use: No  Marital Status: Single
22-Apr-2017 03:39

## 2021-10-20 NOTE — ED PROVIDER NOTE - OBJECTIVE STATEMENT
55 y/o male with hx of COPD who returned to the ER today for SOB. Reports that he had one episode of SOB about a week ago and it subsided on its own. About 2 days ago, he had another episode of SOB and decided to come to the ER for evaluation. Reports that symptom has improved after he received the treatment and pt decided to go home. Reports that he started having SOB again last night and the symptom lasted through today. Reports that he does not have a PCP and has been taking the OTC meds for his SOB due to insurance issue and it is not helping him. Reports that he is a daily smoker since 10 days ago. Denies fever, chest pain, nausea, vomiting, abdominal pain, and other associated symptoms.

## 2021-10-20 NOTE — ED PROVIDER NOTE - ATTENDING CONTRIBUTION TO CARE
56 year old male with pmh 64 pack year smoker, reactive airway disease, who returns today for SOB. He was in the ED on 10/18 for similar sx, labs wnl, CTA chest neg for PE but showed emphysematous changes, and he left AMA after feeling better s/p duoneb & solumedrol. He was also told he had a 4.9cm thoracic aortic aneurysm and was given f/u with CTS, pulm, cardio. He reports after leaving persistent SOB. Does not have inhaler at home. No purulent sputum production, f/c, LE edema.     Gen - NAD, Head - NCAT, Pharynx - clear, MMM, Heart - RRR, no m/g/r, Lungs - mild wheezing, fine crackles b/l bases, Abdomen - soft, NT, ND, Skin - No rash, Extremities - FROM, no edema, erythema, ecchymosis, brisk cap refill, Neuro - A&O x3, equal strength and sensation, non-focal exam    a/p:  SOB  EKG, CXR, labs  duoneb, solumedrol, reassess

## 2021-10-21 NOTE — CONSULT NOTE ADULT - ASSESSMENT
Impression:  Acute COPD exacerbation  Probable FERNANDO  Heavy smoker  Secondary polycythemia  Thoracic Aortic Aneurysm 4.9cm    Recommendations:  CT reviewed no PE  Solumedrol 60mg q8h  Duoneb q4h & q6h prn  Keep POx > 92%  Check QTc, if not prolonged Azithromycin for 5 days  Strep & Legionella Urine Ag  NIV HS  Needs CT surgery eval  ECHO  OOB to chair  GI and DVT prophylaxis  HOB elevated at 45 degrees  Aspiration precautions  Needs outpatient sleep study & PFTs

## 2021-10-21 NOTE — CONSULT NOTE ADULT - ASSESSMENT
Patient with no cardiac history. He was found have thoracic aneurysm. Need review echo. He needs stop smoking. Control bp. He will need see cardiothoracic as a outpatient. If bp remains high can increase amlodine to 10 mg. If ok with pulmonary add low dose bet metoprolol er 12.5 mg once a day

## 2021-10-21 NOTE — DISCHARGE NOTE NURSING/CASE MANAGEMENT/SOCIAL WORK - PATIENT PORTAL LINK FT
You can access the FollowMyHealth Patient Portal offered by [facility] by registering at the following website: [email or web address]. By joining [employer]’s FollowMyHealth portal, you will also be able to view your health information using other applications (apps) compatible with our system.

## 2021-10-21 NOTE — DISCHARGE NOTE PROVIDER - CARE PROVIDERS DIRECT ADDRESSES
[email or web address]

## 2021-10-21 NOTE — CONSULT NOTE ADULT - SUBJECTIVE AND OBJECTIVE BOX
Patient is a 56y old Male who presents with a chief complaint of Dyspnea (20 Oct 2021 18:14)    HPI:  56 year male with a past medical history of Asthma during childhood, heavy smoker & HTN presenting with shortness of breath.  Patient reports that he started experiencing shortness of breath 2 days prior to presentation described as "feeling winded" and unable to perform ADLs.  Patient is a .  Patient has had a similar episode 1 week prior which lasted around 2 hours after using an inhaler that he got from the pharmacy.  Patient was in the ED 2 days ago and given 2 respiratory treatments with relief. The patient was discharged home. Denies wheeze, fever, chills. He admits to chronic sputum production that is whitish in color. No recent upper respiratory illness.  No asthma attacks since childhood. Patient has been a heavy tobacco user for 42 years since the age of 13 1.5 PPD (20 Oct 2021 18:14)      PAST MEDICAL & SURGICAL HISTORY:  HTN (hypertension)  Asthma  No significant past surgical history    SOCIAL HX:   Heavy smoker quit 4 days ago    FAMILY HISTORY:  Family history of heart attack (Mother)  Family history of breast cancer in mother (Mother)    Review Of Systems:   All ROS are negative except per HPI     Allergies  No Known Allergies    Intolerances      PHYSICAL EXAM  ICU Vital Signs Last 24 Hrs  T(C): 36.4 (21 Oct 2021 07:52), Max: 36.4 (20 Oct 2021 19:30)  T(F): 97.5 (21 Oct 2021 07:52), Max: 97.6 (20 Oct 2021 19:30)  HR: 98 (21 Oct 2021 07:52) (98 - 108)  BP: 176/91 (21 Oct 2021 07:52) (138/77 - 184/112)  RR: 16 (21 Oct 2021 07:52) (16 - 20)  SpO2: 96% (21 Oct 2021 06:37) (93% - 97%)    CONSTITUTIONAL:  Well nourished.  NAD    ENT:   Airway patent,   Mouth with normal mucosa.   No thrush    EYES:   pupils equal,   round and reactive to light.    CARDIAC:   Normal rate  Regular rhythm.    Heart sounds S1, S2.   No edema    RESPIRATORY:   Diffusely decreased air entry bilaterally  No wheezing   Normal chest expansion  No use of accessory muscles    GASTROINTESTINAL:  Abdomen soft   Non-tender,   No guarding,   + BS    MUSCULOSKELETAL:   Range of motion is not limited,  No clubbing, cyanosis    NEUROLOGICAL:   Alert and oriented   No motor or sensory deficits.  Pertinent DTRs normal    SKIN:   Skin normal color for race,   Warm and dry  No evidence of rash.  Facial flushing    PSYCHIATRIC:   Normal mood and affect.   No apparent risk to self or others.      LABS:                          14.7   8.13  )-----------( 232      ( 21 Oct 2021 05:46 )             44.0                                               10-21    136  |  101  |  29<H>  ----------------------------<  129<H>  5.0   |  23  |  1.1    Ca    9.6      21 Oct 2021 05:46  Mg     2.3     10-21    TPro  6.6  /  Alb  4.1  /  TBili  0.5  /  DBili  x   /  AST  49<H>  /  ALT  72<H>  /  AlkPhos  78  10-20      CARDIAC MARKERS ( 20 Oct 2021 14:59 )  x     / <0.01 ng/mL / x     / x     / x                                                LIVER FUNCTIONS - ( 20 Oct 2021 14:59 )  Alb: 4.1 g/dL / Pro: 6.6 g/dL / ALK PHOS: 78 U/L / ALT: 72 U/L / AST: 49 U/L / GGT: x                                                  X-Rays reviewed:                                                                                    ECHO    CXR interpreted by me:  hyperinflation    MEDICATIONS  (STANDING):  albuterol/ipratropium for Nebulization 3 milliLiter(s) Nebulizer every 6 hours  amLODIPine   Tablet 5 milliGRAM(s) Oral daily  azithromycin  IVPB 500 milliGRAM(s) IV Intermittent every 24 hours  chlorhexidine 4% Liquid 1 Application(s) Topical <User Schedule>  enoxaparin Injectable 40 milliGRAM(s) SubCutaneous daily  methylPREDNISolone sodium succinate Injectable 40 milliGRAM(s) IV Push every 12 hours    MEDICATIONS  (PRN):

## 2021-10-21 NOTE — DISCHARGE NOTE PROVIDER - NSDCPNSUBOBJ_GEN_ALL_CORE
Vital Signs Last 24 Hrs  T(C): 36.4 (10-21-21 @ 07:52), Max: 36.4 (10-20-21 @ 19:30)  T(F): 97.5 (10-21-21 @ 07:52), Max: 97.6 (10-20-21 @ 19:30)  HR: 98 (10-21-21 @ 07:52) (98 - 108)  BP: 158/93 (10-21-21 @ 10:41) (138/77 - 184/112)  BP(mean): --  RR: 18 (10-21-21 @ 10:41) (16 - 20)  SpO2: 96% (10-21-21 @ 10:41) (93% - 97%)      nad  aaox3  speaking full sentence and ambulating in ER  [id number]  ctabl  sofntnt+bs  nocce    #copd exac - stop smoking change to pred taper, albut, wixela   pt lacks insurance will pay for meds  seen by SW/financial  [facility] appt made    #aortic aneusrym - needs outpt CTS eval and follow up - pt aware of finding and need to follow up     #HTn - noncomplaint - long standing - norvasc 10    stable for dc home  time spent 35mins

## 2021-10-21 NOTE — DISCHARGE NOTE PROVIDER - PROVIDER TOKENS
PROVIDER:[TOKEN:[[id number]],SCHEDULEDAPPT:[10/28/2021],SCHEDULEDAPPTTIME:[01:30 PM]],PROVIDER:[TOKEN:[[id number]],FOLLOWUP:[2 weeks]],PROVIDER:[TOKEN:[[id number]]]

## 2021-10-21 NOTE — DISCHARGE NOTE PROVIDER - NSDCCPCAREPLAN_GEN_ALL_CORE_FT
PRINCIPAL DISCHARGE DIAGNOSIS  Diagnosis: COPD exacerbation  Assessment and Plan of Treatment: stop smoking, take meds as prescribed, follow up with lung doctor - return to ER for severe shortness of breath      SECONDARY DISCHARGE DIAGNOSES  Diagnosis: COPD exacerbation  Assessment and Plan of Treatment:

## 2021-10-21 NOTE — DISCHARGE NOTE PROVIDER - CARE PROVIDER_API CALL
Agustina Castillo)  Internal Medicine  [address] [facility], [address]st [place] [address]  Phone: [phone]  Fax: [phone]  Scheduled Appointment: 10/28/2021 01:30 PM    Trae Carrasco)  Surgery; Thoracic and Cardiac Surgery  [address] [place] [address], [address] [place] [address]  Phone: [phone]  Fax: [phone]  Follow Up Time: 2 weeks    Niels Sims)  Critical Care Medicine; Internal Medicine; Pulmonary Disease  [address] [place] [address], [address] [place] [address]  Phone: [phone]  Fax: [phone]  Follow Up Time:

## 2021-10-21 NOTE — DISCHARGE NOTE NURSING/CASE MANAGEMENT/SOCIAL WORK - NSDCPEFALRISK_GEN_ALL_CORE
For information on Fall & injury Prevention, visit [email or web address]

## 2021-10-21 NOTE — CONSULT NOTE ADULT - SUBJECTIVE AND OBJECTIVE BOX
CARDIOLOGY CONSULT NOTE     CHIEF COMPLAINT/REASON FOR CONSULT:    HPI:                                  57 y/o male Hx: Asthma/HTN: presents to ER c/o Dyspnea. He was seen in the ER 2 days ago and given 2 respiratory treatments with relief. The patient was discharged home.  - He returns yesterday with dyspnea again.  - Denies wheeze, fever, chills. He admits to chronic sputum production that is whitish in color  - No recent upper respiratory illness.  - No asthma attacks in years  - patient has been a heavy tobacco user for 42 years:  1.5 PPD (20 Oct 2021 18:14)      PAST MEDICAL & SURGICAL HISTORY:  HTN (hypertension)    Asthma    No significant past surgical history        Cardiac Risks:   [ x]HTN, [ ] DM, [x ] Smoking, [ ] FH,  [ ] Lipids        MEDICATIONS:  MEDICATIONS  (STANDING):  albuterol/ipratropium for Nebulization 3 milliLiter(s) Nebulizer every 6 hours  amLODIPine   Tablet 5 milliGRAM(s) Oral daily  azithromycin  IVPB 500 milliGRAM(s) IV Intermittent every 24 hours  chlorhexidine 4% Liquid 1 Application(s) Topical <User Schedule>  enoxaparin Injectable 40 milliGRAM(s) SubCutaneous daily  methylPREDNISolone sodium succinate Injectable 40 milliGRAM(s) IV Push every 12 hours      FAMILY HISTORY:  Family history of heart attack (Mother)    Family history of breast cancer in mother (Mother)        SOCIAL HISTORY:      [ ] Marital status single  Allergies    No Known Allergies        	    REVIEW OF SYSTEMS:  CONSTITUTIONAL: No fever, weight loss, or fatigue  EYES: No eye pain, visual disturbances, or discharge  ENMT:  No difficulty hearing, tinnitus, vertigo; No sinus or throat pain  NECK: No pain or stiffness  RESPIRATORY: No cough, wheezing, chills or hemoptysis; No Shortness of Breath Decreased bs  CARDIOVASCULAR: No chest pain, palpitations, passing out, dizziness, or leg swelling  GASTROINTESTINAL: No abdominal or epigastric pain. No nausea, vomiting, or hematemesis; No diarrhea or constipation. No melena or hematochezia.  GENITOURINARY: No dysuria, frequency, hematuria, or incontinence  NEUROLOGICAL: No headaches, memory loss, loss of strength, numbness, or tremors  SKIN: No itching, burning, rashes, or lesions   	        PHYSICAL EXAM:  T(C): 36.4 (10-21-21 @ 07:52), Max: 36.4 (10-20-21 @ 19:30)  HR: 98 (10-21-21 @ 07:52) (98 - 108)  BP: 176/91 (10-21-21 @ 07:52) (138/77 - 184/112)  RR: 16 (10-21-21 @ 07:52) (16 - 20)  SpO2: 96% (10-21-21 @ 06:37) (93% - 97%)  Wt(kg): --  I&O's Summary      Appearance: Normal	  Psychiatry: A & O x 3, Mood & affect appropriate  HEENT:   Normal oral mucosa, PERRL, EOMI	  Lymphatic: No lymphadenopathy  Cardiovascular: Normal S1 S2,RRR, No JVD, No murmurs  Respiratory: Lungs clear to auscultation	  Gastrointestinal:  Soft, Non-tender, + BS	  Skin: No rashes, No ecchymoses, No cyanosis	  Neurologic: Non-focal  Extremities: Normal range of motion, No clubbing, cyanosis or edema  Vascular: Peripheral pulses palpable 2+ bilaterally      ECG:  < from: 12 Lead ECG (10.18.21 @ 17:01) >  Diagnosis Line Sinus tachycardia  Possible Left atrial enlargement  Possible Anterior infarct , age undetermined      Confirmed by CYRUS LIZ MD ([id number]) on 10/18/2021 8:44:47 PM    < end of copied text >  	    	  LABS:	 	    CARDIAC MARKERS:          Serum Pro-Brain Natriuretic Peptide: 1541 pg/mL (10-20 @ 14:59)                            14.7   8.13  )-----------( 232      ( 21 Oct 2021 05:46 )             44.0     10-21    136  |  101  |  29<H>  ----------------------------<  129<H>  5.0   |  23  |  1.1    Ca    9.6      21 Oct 2021 05:46  Mg     2.3     10-21    TPro  6.6  /  Alb  4.1  /  TBili  0.5  /  DBili  x   /  AST  49<H>  /  ALT  72<H>  /  AlkPhos  78  10-20      proBNP: Serum Pro-Brain Natriuretic Peptide: 1541 pg/mL (10-20 @ 14:59)

## 2021-10-21 NOTE — CONSULT NOTE ADULT - ATTENDING COMMENTS
patient seen and examined agree above note   continue solumedrol   nebulizer Q 4 hrs and prn   supriya pox > 92 %   need outpatient follow up need sleep study and full PFT patient seen and examined agree above note except   can be d/c home on prednisone 60 mg daily for 3 days then 40 mg for 3 days and stop   need laba/ inhaled steroids   nebulizer Q 4 hrs and prn   supriya pox > 92 %   need outpatient follow up need sleep study and full PFT

## 2021-10-21 NOTE — DISCHARGE NOTE PROVIDER - NSDCMRMEDTOKEN_GEN_ALL_CORE_FT
Albuterol (Eqv-Proventil HFA) 90 mcg/inh inhalation aerosol: 2 puff(s) inhaled 4 times a day, As Needed   amLODIPine 10 mg oral tablet: 1 tab(s) orally once a day  doxycycline hyclate 100 mg oral tablet: 1 tab(s) orally 2 times a day MDD:2  predniSONE 20 mg oral tablet: 1 tab(s) orally once a day x 9 days   Wixela Inhub 250 mcg-50 mcg inhalation powder: 1 puff(s) inhaled 2 times a day

## 2021-10-21 NOTE — DISCHARGE NOTE PROVIDER - HOSPITAL COURSE
55 y/o male Hx: Asthma/HTN: presents to ER c/o Dyspnea. He was seen in the ER 2 days ago and given 2 respiratory treatments with relief. The patient was discharged home.  - He returns today with dyspnea again.  - Denies wheeze, fever, chills. He admits to chronic sputum production that is whitish in color  - No recent upper respiratory illness.  - No asthma attacks in years  - patient has been a heavy tobacco user for 42 years:  1.5 PPD  - no formal hx of COPD - but emphysema on CT - needs outpt PFTS    # HTN  - bp elevated - nonocomplaint with meds - long standing hx of HTN  - not on antihtn meds at home   - cont norvasc 10mg at home     # Aortic Root Aneurysm   - CT Angio Chest PE Protocol w/ IV Cont (10.18.21): Aneurysmal dilatation of the aortic root measuring up to 4.9 cm. Consider nonemergent/outpatient follow-up with gated cardiac CTA as clinically indicated.  neesd outpt CTS eval    #lack of insurance - financial and sw to see pt - pt pays out of pocket for meds    pt is ambulating around ER without distress, no o2, no sob, wants to go home  will dc home with  [facility] follow up opn 10/28 @130pm

## 2021-10-28 PROBLEM — J44.9 COPD (CHRONIC OBSTRUCTIVE PULMONARY DISEASE): Status: ACTIVE | Noted: 2021-01-01

## 2021-10-28 PROBLEM — Z00.00 ENCOUNTER FOR PREVENTIVE HEALTH EXAMINATION: Status: ACTIVE | Noted: 2021-01-01

## 2021-10-28 PROBLEM — Z82.49 FAMILY HISTORY OF HYPERTENSION: Status: ACTIVE | Noted: 2021-01-01

## 2021-10-28 PROBLEM — Z87.891 FORMER SMOKER: Status: ACTIVE | Noted: 2021-01-01

## 2021-10-28 PROBLEM — J45.909 ASTHMA: Status: ACTIVE | Noted: 2021-01-01

## 2021-10-28 PROBLEM — I10 HTN (HYPERTENSION): Status: ACTIVE | Noted: 2021-01-01

## 2021-11-12 NOTE — ED PROVIDER NOTE - OBJECTIVE STATEMENT
55 yo male, HTN COPD Aortic root aneurysm, presents to ed in cardiac arrest, per ems found in car unresponsive, intubated in field 6 epi given, no rosc; pt asystole  entire time with ems 35 mins.

## 2021-11-12 NOTE — ED ADULT TRIAGE NOTE - CHIEF COMPLAINT QUOTE
EMS notification, cardiac arrest. PT found slumped over in vehicle around 10:55pm, CPR initiated by bystander. PT given 6epi, amp bicarb, 1g calcium given in the field. PT intubated In the field 8.0 ETT

## 2021-11-12 NOTE — ED PROVIDER NOTE - PHYSICAL EXAMINATION
I am unable to obtain a comprehensive history, review of systems, past medical history, and/or physical exam due to constraints imposed by the urgency of the patient's clinical condition and/or mental status.     Physical Exam  Constitutional: unresponsive   Eyes: pupils fixed no reaction to light  Cardiovascular: unresponsive  Respiratory: ett in place   Gastrointestinal: noted mottling to abd   Musculoskeletal: unresponsive, no obvious signs of trauma   Integumentary: cold areas of mottling to extremities and abd.   Neurologic: unresponsive

## 2021-11-12 NOTE — ED PROVIDER NOTE - CLINICAL SUMMARY MEDICAL DECISION MAKING FREE TEXT BOX
55yo M history of HTN COPD Aortic root aneurysm BIBEMS for cardiac arrest. Per EMS, pt was found in his work vehicle unresponsive, unknown down time. Initial rhythm asystole, given 6x epi, 88 bicarb, 1x Ca, intubated with 8.0 ETT. Pt remained in asystole with EMS duration, FS 90s in field. Pt arrived intubated, ETT confirmed with VL. NCAT pupils 4mm non reactive, ETT in place, bagged respirations, abdomen mottled, extremities mottled, cool. see ACLS flowsheet. cardiac standstill on US. pt remained in asystole. ultimately  at 1139pm.

## 2021-11-13 VITALS — TEMPERATURE: 96 F

## 2021-11-13 NOTE — ED ADULT NURSE NOTE - NS_BELOGINGS_RETUNED_RELATION_ED_ALL_ED_FT
daughter Scribe Attestation (For Scribes USE Only)... Scribe Attestation (For Scribes USE Only).../Attending Attestation (For Attendings USE Only)...

## 2021-11-14 NOTE — HISTORY OF PRESENT ILLNESS
[FreeTextEntry1] : 56 year old male with history of Asthma/COPD, recently admitted to [facility] for COPD exacerbation presented to establish care and hospital discharge follow up [de-identified] : 56 year old male with history of Asthma/COPD, recently admitted to [facility] for COPD exacerbation presented to establish care and hospital discharge follow up. Denies any SOB, stopped smoking, no allergies\par Patient was discharged on inhaler and steroid taper course

## 2021-11-17 ENCOUNTER — APPOINTMENT (OUTPATIENT)
Dept: CARDIOTHORACIC SURGERY | Facility: CLINIC | Age: 56
End: 2021-11-17

## 2021-11-18 ENCOUNTER — APPOINTMENT (OUTPATIENT)
Dept: INTERNAL MEDICINE | Facility: CLINIC | Age: 56
End: 2021-11-18

## 2021-12-22 ENCOUNTER — APPOINTMENT (OUTPATIENT)
Dept: INTERNAL MEDICINE | Facility: CLINIC | Age: 56
End: 2021-12-22

## 2022-01-14 ENCOUNTER — APPOINTMENT (OUTPATIENT)
Dept: GASTROENTEROLOGY | Facility: CLINIC | Age: 57
End: 2022-01-14

## 2022-02-10 NOTE — ED ADULT NURSE NOTE - NS ED NURSE RECORD ANOTHER HT AND WT
Quality 111:Pneumonia Vaccination Status For Older Adults: Pneumococcal Vaccination Previously Received Quality 110: Preventive Care And Screening: Influenza Immunization: Influenza Immunization previously received during influenza season Detail Level: Detailed Quality 226: Preventive Care And Screening: Tobacco Use: Screening And Cessation Intervention: Patient screened for tobacco use and is an ex/non-smoker Yes

## 2022-09-01 NOTE — ED PROVIDER NOTE - CARE PLAN
Assist patient to bedside commode, patient had a lot of gas and large loose BM. Principal Discharge DX:	SOB (shortness of breath)  Secondary Diagnosis:	COPD exacerbation   1

## 2022-09-23 LAB
25(OH)D3 SERPL-MCNC: 27 NG/ML
CHOLEST SERPL-MCNC: 238 MG/DL
ESTIMATED AVERAGE GLUCOSE: 111 MG/DL
HBA1C MFR BLD HPLC: 5.5 %
HDLC SERPL-MCNC: 62 MG/DL
LDLC SERPL CALC-MCNC: 123 MG/DL
NONHDLC SERPL-MCNC: 176 MG/DL
TRIGL SERPL-MCNC: 306 MG/DL
TSH SERPL-ACNC: 1.69 UIU/ML

## 2023-07-03 NOTE — CONSULT NOTE ADULT - CONSULT REASON
Shortness of breath
aortic aneurysm
Patient with one or more new problems requiring additional work-up/treatment.

## 2023-09-05 NOTE — ED PROVIDER NOTE - INTERPRETATION
Please follow-up with your primary care for recheck in the next few days.  Take antibiotics and steroids as directed.  Take the first dose of antibiotics tonight, start the steroids tomorrow morning.  Return if any new or worsening symptoms, such as increasing chest discomfort, shortness of breath, significant blood in the sputum, fevers, or any other concerning symptoms.  
sinus tachycardia with LVH and PVCs

## 2024-04-18 NOTE — PLAN
Patient discontinued medication. Currently stable. Will continue to monitor.   [FreeTextEntry1] : 56 year old male with history of Asthma/COPD, recently admitted to [facility] for COPD exacerbation presented to establish care and hospital discharge follow up. Denies any SOB, stopped smoking, no allergies\par \par # Asthma/copd exacerbation\par - discharged from hospital with albuterol, and steroids taper course\par - feeling better, Dyspnea improved, CT angio negative for PE\par - pending pulmonary follow up\par \par # HTN (173/111) > repeat 167/95\par - new onset, patient was not on any medication\par - started on valsartan-HCTZ 160-25mg qd\par \par # Cardiomegaly, Aneurysmal dilatation of the aortic root measuring 4..9 cm\par - pending cardiology follow up.\par \par # HCM\par - S/p covid vaccine in June 2021\par - refused flu vaccine today\par - Colonoscopy: referral provided\par - RTC in one month, to recheck BP
